# Patient Record
Sex: MALE | Race: WHITE | Employment: OTHER
[De-identification: names, ages, dates, MRNs, and addresses within clinical notes are randomized per-mention and may not be internally consistent; named-entity substitution may affect disease eponyms.]

---

## 2023-12-03 ENCOUNTER — APPOINTMENT (OUTPATIENT)
Facility: HOSPITAL | Age: 63
End: 2023-12-03

## 2023-12-03 ENCOUNTER — HOSPITAL ENCOUNTER (EMERGENCY)
Facility: HOSPITAL | Age: 63
Discharge: HOME | End: 2023-12-03
Attending: EMERGENCY MEDICINE

## 2023-12-03 VITALS
HEART RATE: 86 BPM | SYSTOLIC BLOOD PRESSURE: 169 MMHG | OXYGEN SATURATION: 100 % | BODY MASS INDEX: 28.56 KG/M2 | RESPIRATION RATE: 14 BRPM | TEMPERATURE: 98.1 F | WEIGHT: 177.69 LBS | HEIGHT: 66 IN | DIASTOLIC BLOOD PRESSURE: 90 MMHG

## 2023-12-03 DIAGNOSIS — G44.82 COITAL HEADACHE: Primary | ICD-10-CM

## 2023-12-03 LAB
CA-I BLD-SCNC: 1.26 MMOL/L (ref 1.12–1.32)
CREATININE, POC: 1.03 MG/DL (ref 0.51–1.19)

## 2023-12-03 PROCEDURE — 82330 ASSAY OF CALCIUM: CPT

## 2023-12-03 PROCEDURE — 70450 CT HEAD/BRAIN W/O DYE: CPT

## 2023-12-03 PROCEDURE — 70498 CT ANGIOGRAPHY NECK: CPT

## 2023-12-03 PROCEDURE — 80047 BASIC METABLC PNL IONIZED CA: CPT

## 2023-12-03 PROCEDURE — 99285 EMERGENCY DEPT VISIT HI MDM: CPT

## 2023-12-03 PROCEDURE — 6360000004 HC RX CONTRAST MEDICATION: Performed by: EMERGENCY MEDICINE

## 2023-12-03 PROCEDURE — 82565 ASSAY OF CREATININE: CPT

## 2023-12-03 RX ADMIN — IOPAMIDOL 100 ML: 755 INJECTION, SOLUTION INTRAVENOUS at 11:59

## 2023-12-03 ASSESSMENT — PAIN SCALES - GENERAL: PAINLEVEL_OUTOF10: 0

## 2023-12-03 NOTE — ED NOTES
Pt reports transient, pounding headache and throbbing behind right eye and temple. Pt reports intermittent headaches have been occurring for years, worse was last night and stiff neck has been ongoing for 1 month. Pt reports most occurrences happen at the height of an orgasm. Pt denies dizziness, vision changes, cp, sob, n/v, diarrhea/constipation.           Yusuf oNel RN  12/03/23 [id number]

## 2023-12-03 NOTE — DISCHARGE INSTRUCTIONS
It was a pleasure taking care of you at [facility] Emergency Department today. We know that when you come to [address] [place] [address], you are entrusting us with your health, comfort, and safety. Our physicians and nurses honor that trust, and we truly appreciate the opportunity to care for you and your loved ones. We also value your feedback. If you receive a survey about your Emergency Department experience today, please fill it out. We care about our patients' feedback, and we listen to what you have to say. Thank you!

## 2023-12-04 LAB
ANION GAP BLD CALC-SCNC: NORMAL MMOL/L (ref 10–20)
CA-I BLD-MCNC: 1.26 MMOL/L (ref 1.12–1.32)
CHLORIDE BLD-SCNC: 106 MMOL/L (ref 98–107)
POTASSIUM BLD-SCNC: 4.3 MMOL/L (ref 3.5–5.1)
SODIUM BLD-SCNC: 141 MMOL/L (ref 136–145)

## 2024-02-05 ENCOUNTER — HOSPITAL ENCOUNTER (OUTPATIENT)
Age: 64
Discharge: HOME | End: 2024-02-08
Payer: COMMERCIAL

## 2024-02-05 DIAGNOSIS — G93.89 BRAIN MASS: ICD-10-CM

## 2024-02-05 PROCEDURE — A9579 GAD-BASE MR CONTRAST NOS,1ML: HCPCS | Performed by: RADIOLOGY

## 2024-02-05 PROCEDURE — 6360000004 HC RX CONTRAST MEDICATION: Performed by: RADIOLOGY

## 2024-02-05 PROCEDURE — 70553 MRI BRAIN STEM W/O & W/DYE: CPT

## 2024-02-05 RX ADMIN — GADOTERIDOL 15 ML: 279.3 INJECTION, SOLUTION INTRAVENOUS at 10:44

## 2024-06-17 ENCOUNTER — OFFICE VISIT (OUTPATIENT)
Age: 64
End: 2024-06-17
Payer: COMMERCIAL

## 2024-06-17 VITALS
HEART RATE: 76 BPM | HEIGHT: 66 IN | SYSTOLIC BLOOD PRESSURE: 104 MMHG | DIASTOLIC BLOOD PRESSURE: 76 MMHG | RESPIRATION RATE: 16 BRPM | BODY MASS INDEX: 29.73 KG/M2 | OXYGEN SATURATION: 98 % | WEIGHT: 185 LBS

## 2024-06-17 DIAGNOSIS — D32.9 MENINGIOMA (HCC): ICD-10-CM

## 2024-06-17 DIAGNOSIS — R51.9 TEMPORAL PAIN: ICD-10-CM

## 2024-06-17 DIAGNOSIS — G50.0 TRIGEMINAL NEURALGIA PAIN: Primary | ICD-10-CM

## 2024-06-17 PROCEDURE — 99205 OFFICE O/P NEW HI 60 MIN: CPT | Performed by: PSYCHIATRY & NEUROLOGY

## 2024-06-17 RX ORDER — LISINOPRIL 20 MG/1
1 TABLET ORAL
COMMUNITY

## 2024-06-17 ASSESSMENT — PATIENT HEALTH QUESTIONNAIRE - PHQ9
SUM OF ALL RESPONSES TO PHQ QUESTIONS 1-9: 0
1. LITTLE INTEREST OR PLEASURE IN DOING THINGS: NOT AT ALL
SUM OF ALL RESPONSES TO PHQ QUESTIONS 1-9: 0
SUM OF ALL RESPONSES TO PHQ QUESTIONS 1-9: 0
2. FEELING DOWN, DEPRESSED OR HOPELESS: NOT AT ALL
SUM OF ALL RESPONSES TO PHQ QUESTIONS 1-9: 0
SUM OF ALL RESPONSES TO PHQ9 QUESTIONS 1 & 2: 0

## 2024-06-17 NOTE — PROGRESS NOTES
Chief Complaint   Patient presents with    New Patient     Neurology consult        HISTORY OF PRESENT ILLNESS  Nik Zhong is a 63 y.o. male who was referred from the emergency department for evaluation of right-sided temporal pain.  He states that he may have had slight pain off and on for many years but few months ago he had an episode of excruciating right temporal pain during sexual intercourse that prompted him to seek medical attention.  He went to the emergency department and had a CTA of the head and neck which was unremarkable.  This was followed by an MRI scan of the brain which did not show anything acute but did show a left parasagittal parietal meningioma and a chronic appearing white matter spot in the left parietal subcortical region.  He continues to experience episodic pain which is mainly in the right temporal region going to the forehead above his brow.  Sometimes it will settle in the jaw and radiate down.  Recently he has noticed that brushing his teeth will set it off.  It usually lasts less than a minute and then subsides and sometimes will occur more than once per day.  Denies any problems with chewing, biting, swallowing.  He does have some hearing issues in his left ear for which she had an MRI in 2016 which was reportedly unremarkable.      Past Medical History:   Diagnosis Date    Hypertension      Current Outpatient Medications   Medication Sig    lisinopril (PRINIVIL;ZESTRIL) 20 MG tablet Take 1 tablet by mouth Every Day    cycloSPORINE (RESTASIS OP) Apply to eye as needed     No current facility-administered medications for this visit.     No Known Allergies  Family History   Problem Relation Age of Onset    Diabetes Father      Social History     Tobacco Use    Smoking status: Former     Current packs/day: 0.00     Average packs/day: 1 pack/day for 3.0 years (3.0 ttl pk-yrs)     Types: Cigarettes     Start date: 1984     Quit date: 1986     Years since quittin.2

## 2024-06-18 LAB
CRP SERPL-MCNC: <1 MG/L (ref 0–10)
ERYTHROCYTE [SEDIMENTATION RATE] IN BLOOD BY WESTERGREN METHOD: 2 MM/HR (ref 0–30)

## 2024-08-05 ENCOUNTER — HOSPITAL ENCOUNTER (OUTPATIENT)
Age: 64
Discharge: HOME | End: 2024-08-08
Payer: COMMERCIAL

## 2024-08-05 DIAGNOSIS — G93.89 BRAIN MASS: ICD-10-CM

## 2024-08-05 PROCEDURE — A9579 GAD-BASE MR CONTRAST NOS,1ML: HCPCS | Performed by: RADIOLOGY

## 2024-08-05 PROCEDURE — 70553 MRI BRAIN STEM W/O & W/DYE: CPT

## 2024-08-05 PROCEDURE — 6360000004 HC RX CONTRAST MEDICATION: Performed by: RADIOLOGY

## 2024-08-05 RX ADMIN — GADOTERIDOL 17 ML: 279.3 INJECTION, SOLUTION INTRAVENOUS at 14:33

## 2025-05-06 ENCOUNTER — TRANSCRIBE ORDERS (OUTPATIENT)
Facility: HOSPITAL | Age: 65
End: 2025-05-06

## 2025-05-06 DIAGNOSIS — G93.89 BRAIN MASS: Primary | ICD-10-CM

## 2025-06-03 ENCOUNTER — HOSPITAL ENCOUNTER (EMERGENCY)
Facility: HOSPITAL | Age: 65
Discharge: HOME | End: 2025-06-03
Attending: EMERGENCY MEDICINE
Payer: COMMERCIAL

## 2025-06-03 VITALS
OXYGEN SATURATION: 95 % | TEMPERATURE: 98.3 F | RESPIRATION RATE: 15 BRPM | DIASTOLIC BLOOD PRESSURE: 68 MMHG | HEART RATE: 75 BPM | SYSTOLIC BLOOD PRESSURE: 129 MMHG

## 2025-06-03 DIAGNOSIS — I10 PRIMARY HYPERTENSION: Primary | ICD-10-CM

## 2025-06-03 LAB
EKG ATRIAL RATE: 79 BPM
EKG DIAGNOSIS: NORMAL
EKG P AXIS: 68 DEGREES
EKG P-R INTERVAL: 146 MS
EKG Q-T INTERVAL: 376 MS
EKG QRS DURATION: 98 MS
EKG QTC CALCULATION (BAZETT): 431 MS
EKG R AXIS: 58 DEGREES
EKG T AXIS: 56 DEGREES
EKG VENTRICULAR RATE: 79 BPM

## 2025-06-03 PROCEDURE — 99284 EMERGENCY DEPT VISIT MOD MDM: CPT

## 2025-06-03 PROCEDURE — 93005 ELECTROCARDIOGRAM TRACING: CPT | Performed by: EMERGENCY MEDICINE

## 2025-06-03 ASSESSMENT — PAIN SCALES - GENERAL: PAINLEVEL_OUTOF10: 0

## 2025-06-03 ASSESSMENT — LIFESTYLE VARIABLES
HOW OFTEN DO YOU HAVE A DRINK CONTAINING ALCOHOL: NEVER
HOW MANY STANDARD DRINKS CONTAINING ALCOHOL DO YOU HAVE ON A TYPICAL DAY: PATIENT DOES NOT DRINK

## 2025-06-03 NOTE — ED PROVIDER NOTES
[facility] EMERGENCY DEPARTMENT  EMERGENCY DEPARTMENT ENCOUNTER    Patient Name: Nik Zhong  MRN: [id number]  YOB: 1960  Provider: Richard Kc MD  PCP: No primary care provider on file. (MARI Barton)  Time/Date of evaluation: 9:54 AM EDT on 6/3/25    History of Presenting Illness     Chief Complaint   Patient presents with    Hypertension     Patient ambulatory to triage with hypertension and tachycardia starting yesterday. Patient states his baseline heart rate is around 70, and that he noticed his HR was in the 90s last night. Patient reports SBP around 140; denies any missed doses of prescribed medications. Denies chest pain or headaches.    Tachycardia     History Provided by: Patient and Patient's Wife   History is limited by: Nothing    HISTORY (Narrative):   Nik Zhong is a 64 y.o. male with a PMHX of hypertension  who presents to the emergency department (room 21) by POV C/O asymptomatic hypertension and heart rates in the 90 range.  He states his heart rate is normally around 70 bpm.  His blood pressure was elevated at 140 systolic and he tells me it normally runs in the 120 range.  He denies any chest pain, shortness of breath, blurry vision, dizziness, or any additional symptoms.    Nursing Notes were all reviewed and agreed with or any disagreements were addressed in the HPI.    Past History     PAST MEDICAL HISTORY:  Past Medical History:   Diagnosis Date    Hypertension        PAST SURGICAL HISTORY:  No past surgical history on file.    FAMILY HISTORY:  Family History   Problem Relation Age of Onset    Diabetes Father        SOCIAL HISTORY:  Social History     Tobacco Use    Smoking status: Former     Current packs/day: 0.00     Average packs/day: 1 pack/day for 3.0 years (3.0 ttl pk-yrs)     Types: Cigarettes     Start date: 1984     Quit date: 1986     Years since quittin.2    Smokeless tobacco: Never   Vaping Use    Vaping status: Never Used

## 2025-06-03 NOTE — ED NOTES
Report given to BRENDAN Dey. Nurse was informed of reason for arrival, vitals, labs, medications, orders, procedures, results, anything left pending and current plan of action. Questions were asked and received prior to departure from the patient.

## 2025-06-03 NOTE — PROGRESS NOTES
Spiritual Care Partner Volunteer visited patient at [facility] in [facility] EMERGENCY DEPARTMENT on 6/3/2025   Documented by:    MENDOZA Garcia, [facility]   Paging Service [phone])

## 2025-06-03 NOTE — DISCHARGE INSTRUCTIONS
Take your Lisinopril at the same time every day and check your blood pressure 2 hours after your dose.

## 2025-06-04 ENCOUNTER — CARE COORDINATION (OUTPATIENT)
Dept: OTHER | Facility: CLINIC | Age: 65
End: 2025-06-04

## 2025-06-04 NOTE — CARE COORDINATION
Ambulatory Care Coordination Note     6/4/2025 10:33 AM     Patient outreach attempt by this ACM today to offer care management services. ACM was unable to reach the patient by telephone today;   left voice message requesting a return phone call to this ACM.     ACM: Xiomara Pruett RN     Care Summary Note: assigned from [facility] discharge list post ED visit for tachycardia and hypertension    PCP/Specialist follow up:       Follow Up:   Plan for next ACM outreach in approximately 1 week to complete:  - outreach attempt to offer care management services.

## 2025-06-05 ENCOUNTER — CARE COORDINATION (OUTPATIENT)
Dept: OTHER | Facility: CLINIC | Age: 65
End: 2025-06-05

## 2025-06-05 NOTE — CARE COORDINATION
Ambulatory Care Coordination Note     2025 9:27 AM     Patient Current Location:  [place]     This patient was received as a referral from Population health report .    ACM contacted the patient by telephone. Verified name and  with patient as identifiers. Provided introduction to self, and explanation of the ACM role.   Patient declined care management services at this time.          ACM: Xiomara Pruett RN     Challenges to be reviewed by the provider   Additional needs identified to be addressed with provider No  None               Method of communication with provider: none.    Utilization: Initial Call - Discharge Date: 6/3/25   Discharge Facility: [facility]  Reason for ED Visit: high heart rate and blood pressure  Visit Diagnosis: primary hypertension    Number of ED visits in the last 6 months: 1      Do you have any ongoing symptoms?  Patient taking a break from monitoring blood pressure and heart rate  Did you call your PCP prior to going to the ED? No, did not call the PCP office.     Review of Discharge Instructions:   [x] AVS discharge instructions  [] Right Care, Right Place, Right Time document  [] Medication changes  [x] Follow up appointments  [] Referral follow up   [x] Nurse Access Line       Care Summary Note: Patient presented to ED due to resting heart rate of 105 bpm and blood pressure that was increasing.  Patient states feels good today and denies any symptoms.  Patient states physician previously increased Lisinopril to 30 and then 40 mg.  Patient is currently taking 20 mg po daily due to symptomatic hypotension when taking 40 mg po daily.  Patient reports limited caffeine intake and none at night.  Patient states does not eat a lot of processed foods but does add salt to foods.  Patient states eats clean.  Discussed mg of sodium in even small amounts of added salt.  Patient states hydrates with a lot of water.  Patient engages in exercise.  Patient denies

## 2025-08-20 ENCOUNTER — HOSPITAL ENCOUNTER (OUTPATIENT)
Age: 65
Discharge: HOME | End: 2025-08-23
Payer: COMMERCIAL

## 2025-08-20 DIAGNOSIS — G93.89 BRAIN MASS: ICD-10-CM

## 2025-08-20 PROCEDURE — 70551 MRI BRAIN STEM W/O DYE: CPT
